# Patient Record
Sex: FEMALE | Race: BLACK OR AFRICAN AMERICAN | Employment: UNEMPLOYED | URBAN - METROPOLITAN AREA
[De-identification: names, ages, dates, MRNs, and addresses within clinical notes are randomized per-mention and may not be internally consistent; named-entity substitution may affect disease eponyms.]

---

## 2017-01-31 DIAGNOSIS — Z79.4 TYPE 2 DIABETES MELLITUS WITH HYPERGLYCEMIA, WITH LONG-TERM CURRENT USE OF INSULIN (HCC): Primary | ICD-10-CM

## 2017-01-31 DIAGNOSIS — E11.65 TYPE 2 DIABETES MELLITUS WITH HYPERGLYCEMIA, WITH LONG-TERM CURRENT USE OF INSULIN (HCC): Primary | ICD-10-CM

## 2017-01-31 RX ORDER — INSULIN LISPRO 100 [IU]/ML
INJECTION, SOLUTION INTRAVENOUS; SUBCUTANEOUS
Qty: 30 ML | Refills: 2 | Status: SHIPPED | OUTPATIENT
Start: 2017-01-31 | End: 2019-09-12 | Stop reason: ALTCHOICE

## 2017-02-03 ENCOUNTER — TELEPHONE (OUTPATIENT)
Dept: ENDOCRINOLOGY | Age: 37
End: 2017-02-03

## 2017-02-22 ENCOUNTER — TELEPHONE (OUTPATIENT)
Dept: ENDOCRINOLOGY | Age: 37
End: 2017-02-22

## 2017-03-06 NOTE — TELEPHONE ENCOUNTER
Received fax from [facility] stating pt is no longer eligible. Attempted to call all 3 #s on file to have pt update insurance information. #'s either not in service/ no voicemail setup.

## 2018-08-14 RX ORDER — INSULIN DETEMIR 100 [IU]/ML
INJECTION, SOLUTION SUBCUTANEOUS
Qty: 10 ML | Refills: 0 | Status: SHIPPED | OUTPATIENT
Start: 2018-08-14 | End: 2019-09-12

## 2018-08-16 RX ORDER — PEN NEEDLE, DIABETIC 30 GX3/16"
NEEDLE, DISPOSABLE MISCELLANEOUS
Qty: 300 PEN NEEDLE | Refills: 0 | Status: SHIPPED | OUTPATIENT
Start: 2018-08-16 | End: 2019-09-13 | Stop reason: SDUPTHER

## 2019-09-12 ENCOUNTER — OFFICE VISIT (OUTPATIENT)
Dept: ENDOCRINOLOGY | Age: 39
End: 2019-09-12

## 2019-09-12 VITALS
OXYGEN SATURATION: 100 % | HEIGHT: 61 IN | WEIGHT: 239 LBS | HEART RATE: 80 BPM | BODY MASS INDEX: 45.12 KG/M2 | DIASTOLIC BLOOD PRESSURE: 80 MMHG | TEMPERATURE: 97.1 F | SYSTOLIC BLOOD PRESSURE: 157 MMHG | RESPIRATION RATE: 14 BRPM

## 2019-09-12 DIAGNOSIS — E66.01 MORBID OBESITY (HCC): ICD-10-CM

## 2019-09-12 DIAGNOSIS — I10 ESSENTIAL HYPERTENSION: ICD-10-CM

## 2019-09-12 DIAGNOSIS — E11.65 UNCONTROLLED TYPE 2 DIABETES MELLITUS WITH HYPERGLYCEMIA (HCC): Primary | ICD-10-CM

## 2019-09-12 DIAGNOSIS — E11.65 TYPE 2 DIABETES MELLITUS WITH HYPERGLYCEMIA, WITH LONG-TERM CURRENT USE OF INSULIN (HCC): Primary | ICD-10-CM

## 2019-09-12 DIAGNOSIS — E11.65 TYPE 2 DIABETES MELLITUS WITH HYPERGLYCEMIA, UNSPECIFIED WHETHER LONG TERM INSULIN USE (HCC): ICD-10-CM

## 2019-09-12 DIAGNOSIS — Z79.4 TYPE 2 DIABETES MELLITUS WITH HYPERGLYCEMIA, WITH LONG-TERM CURRENT USE OF INSULIN (HCC): Primary | ICD-10-CM

## 2019-09-12 RX ORDER — INSULIN ASPART 100 [IU]/ML
INJECTION, SOLUTION INTRAVENOUS; SUBCUTANEOUS
Qty: 30 ML | Refills: 11 | Status: SHIPPED | OUTPATIENT
Start: 2019-09-12 | End: 2020-10-09

## 2019-09-12 RX ORDER — INSULIN PUMP SYRINGE, 3 ML
EACH MISCELLANEOUS
Qty: 1 KIT | Refills: 0 | Status: SHIPPED | OUTPATIENT
Start: 2019-09-12

## 2019-09-12 RX ORDER — INSULIN ASPART 100 [IU]/ML
INJECTION, SOLUTION INTRAVENOUS; SUBCUTANEOUS
COMMUNITY
End: 2019-09-12 | Stop reason: SDUPTHER

## 2019-09-12 RX ORDER — INSULIN GLARGINE 100 [IU]/ML
INJECTION, SOLUTION SUBCUTANEOUS
Qty: 20 ML | Refills: 11 | Status: SHIPPED | OUTPATIENT
Start: 2019-09-12 | End: 2020-09-08 | Stop reason: SDUPTHER

## 2019-09-12 RX ORDER — METFORMIN HYDROCHLORIDE 750 MG/1
750 TABLET, EXTENDED RELEASE ORAL 2 TIMES DAILY
Qty: 60 TAB | Refills: 11 | Status: SHIPPED | OUTPATIENT
Start: 2019-09-12 | End: 2020-10-27 | Stop reason: SDUPTHER

## 2019-09-12 RX ORDER — LANCETS 33 GAUGE
EACH MISCELLANEOUS
Qty: 100 LANCET | Refills: 11 | Status: SHIPPED | OUTPATIENT
Start: 2019-09-12

## 2019-09-12 NOTE — PATIENT INSTRUCTIONS
Check blood sugars before meals     If the bedtime sugars are less than 100 ,eat a 15 gm snack. Weight and diet control. Lantus insulin 60 units at bedtime    Humalog or Novolog insulin ( fast acting ) 15 units before breakfast  15 units before lunch and 15 units before dinner.      Metformin extended release 750 mg twice day     Additional Humalog or Novolog Blood sugar  Breakfast/Lunch/Dinner        150-200  Add  3  Units       201-250  Add  6 Units       251-300  Add  9 Units       301-350  Add 12  Units        351-400  Add 15  Units

## 2019-09-12 NOTE — PROGRESS NOTES
Kelle Joshi is a 41 y.o. female here for   Chief Complaint   Patient presents with    Diabetes     LV 2016    Diabetic Foot Exam       Functional glucose monitor and record keeping system? -yes   Eye exam within last year? -due   Foot exam within last year? - due    1. Have you been to the ER, urgent care clinic since your last visit? Hospitalized since your last visit? -[facility] 8/19/19 left thigh cellulitis    2. Have you seen or consulted any other health care providers outside of the [address] [place] [address] since your last visit?   Include any pap smears or colon screening.-[facility]

## 2019-09-12 NOTE — PROGRESS NOTES
Lizzette Caruso MD              Patient Information  Date:9/12/2019  Name : Job Raman 41 y.o.     YOB: 1980         Referred by: No primary care provider on file. Chief Complaint   Patient presents with    Diabetes     LV 2016    Diabetic Foot Exam       History of Present Illness: Job Raman is a 41 y.o. female here here for evaluation management of type 2 diabetes mellitus. She was seen in 2016, for follow-up. Recently had spider bite and cellulitis, was on antibiotics. She is on Lantus, taking prandial insulin based on the sliding scale. She is not checking the blood glucose and hence not taking prandial insulin consistently. Not on metformin  Appetite has decreased, she is drinking sodas and on liquid calories  Works as a CNA  Complains of tiredness, fatigue.   No exercise  No recent dietitian visit    History of kidney stone    Hypoglycemia: no    No chest pain or shortness of breath  Weight has been fluctuating      Wt Readings from Last 3 Encounters:   09/12/19 239 lb (108.4 kg)   03/09/16 260 lb (117.9 kg)   09/09/14 242 lb (109.8 kg)       BP Readings from Last 3 Encounters:   09/12/19 157/80   03/09/16 (!) 153/91   09/09/14 139/87           Past Medical History:   Diagnosis Date    Asthma 7/19/2010    Blood infection ([address] 2014    Excessive, frequent and irregular menstruation 7/19/2010    HTN (hypertension) 7/19/2010    Kidney stones 2014    Obese 7/19/2010    Obesity 7/19/2010    Type II or unspecified type diabetes mellitus without mention of complication, uncontrolled      Current Outpatient Medications   Medication Sig    insulin aspart U-100 (NOVOLOG FLEXPEN U-100 INSULIN) 100 unit/mL (3 mL) inpn With SSI    insulin glargine (LANTUS) 100 unit/mL injection Inject 40 units at bedtime (Patient taking differently: Inject 60 units at bedtime)    Insulin Needles, Disposable, (BD ULTRA-FINE SHORT PEN NEEDLE) 31 gauge x 5/16\" ndle USE AS DIRECTED daily Dx Code: E11.65    insulin lispro (HUMALOG) 100 unit/mL injection Inject 8 units before breakfast 8 units before lunch and 8 units before dinner w/SSI. Max Daily Units: 69 Stop Apidra    glucose blood VI test strips (TRUETEST TEST STRIPS) strip Test blood glucose 3x daily    Lancets misc Test blood gluocose 3x daily    insulin syringe-needle U-100 (BD INSULIN SYRINGE ULTRA-FINE) 1 mL 31 x 15/64\" syrg 1 Syringe by Does Not Apply route four (4) times daily.  lisinopril-hydrochlorothiazide (PRINZIDE, ZESTORETIC) 10-12.5 mg per tablet TAKE 1 TABLET BY MOUTH EVERY DAY    glucose blood VI test strips (ACCU-CHEK SMARTVIEW TEST STRIP) strip Test blood glucose 3 times daily     No current facility-administered medications for this visit. No Known Allergies      Review of Systems: All review of symptoms negative other than mentioned in HPI  Physical Examination:   Blood pressure 157/80, pulse 80, temperature 97.1 °F (36.2 °C), temperature source Oral, resp. rate 14, height 5' 1\" (1.549 m), weight 239 lb (108.4 kg), SpO2 100 %. Estimated body mass index is 45.16 kg/m² as calculated from the following:    Height as of this encounter: 5' 1\" (1.549 m). -   Weight as of this encounter: 239 lb (108.4 kg).   - General: pleasant, no distress, good eye contact,obese  - HEENT: no pallor, no periorbital edema, EOMI  - Neck: supple, no thyromegaly, no lyphadenopathy  - Cardiovascular: regular, normal rate, normal S1 and S2  - Respiratory: clear to auscultation bilaterally  - Gastrointestinal: soft, nontender, nondistended,  BS +  - Musculoskeletal:+ acanthosis nigricans,  - Neurological: alert and oriented  - Psychiatric: normal mood and affect  - Skin: color, texture, turgor normal.   Diabetic foot exam: September 2019    Left:     Vibratory sensation absent   Filament test decreased sensation with micro filament   Pulse DP: 1+    Deformities: None  Right:    Vibratory sensation absent   Filament test decreased sensation with micro filament   Pulse DP: 1+   Deformities: None      Data Reviewed:     A1c 11 August 2019      Assessment/Plan:     1. Type 2 Diabetes Mellitus ,uncontrolled,insulin resistance  Poorly controlled diabetes mellitus, high risk for complications  Counseled at length  Lantus/NovoLog  Metformin extended release 750 mg twice daily, discussed the benefits  Stressed the importance of low-carb diet, increase activity as tolerated  Avoid liquid calories  Advised to check glucose 3 times daily    FLU annually ,Pneumovax ,aspirin daily,annual eye exam,microalbumin    2. HTN : Continue current therapy  Low-salt diet    3. Obesity:Body mass index is 45.16 kg/m². BMI is out of normal parameters and plan is as follows: I have counseled this patient on diet and exercise regimens. .Discussed about the importance of exercise. Could not afford Victoza in the past          Patient Instructions   Check blood sugars before meals     If the bedtime sugars are less than 100 ,eat a 15 gm snack. Weight and diet control. Lantus insulin 60 units at bedtime    Humalog or Novolog insulin ( fast acting ) 15 units before breakfast  15 units before lunch and 15 units before dinner. Metformin 1000 mg twice day     Additional Humalog or Novolog Blood sugar  Breakfast/Lunch/Dinner        150-200  Add  3  Units       201-250  Add  6 Units       251-300  Add  9 Units       301-350  Add 12  Units        351-400  Add 15  Units           Thank you for allowing me to participate in the care of this patient.     Holly Covarrubias MD

## 2019-09-12 NOTE — LETTER
9/12/19 Patient: Linda Burroughs YOB: 1980 Date of Visit: 9/12/2019 Princess Yi MD 
[id number] [address] [address] VIA In Basket Dear Princess Yi MD, Thank you for referring Ms. Linda Burroughs to [address] [place] [address] for evaluation. My notes for this consultation are attached. If you have questions, please do not hesitate to call me. I look forward to following your patient along with you. Sincerely, Madyson Arita MD

## 2019-09-13 ENCOUNTER — TELEPHONE (OUTPATIENT)
Dept: ENDOCRINOLOGY | Age: 39
End: 2019-09-13

## 2019-09-13 RX ORDER — SYRINGE AND NEEDLE,INSULIN,1ML 31GX15/64"
1 SYRINGE, EMPTY DISPOSABLE MISCELLANEOUS DAILY
Qty: 100 PEN NEEDLE | Refills: 11 | Status: SHIPPED | OUTPATIENT
Start: 2019-09-13 | End: 2020-09-08 | Stop reason: SDUPTHER

## 2019-09-13 RX ORDER — PEN NEEDLE, DIABETIC 30 GX3/16"
NEEDLE, DISPOSABLE MISCELLANEOUS
Qty: 300 PEN NEEDLE | Refills: 3 | Status: SHIPPED | OUTPATIENT
Start: 2019-09-13 | End: 2020-09-08 | Stop reason: SDUPTHER

## 2019-09-13 NOTE — LETTER
9/16/2019 10:07 AM 
 
Ms. Hortencia Smalls 
[address] [id number] To Whom it may concern, Hortencia Smalls is a patient of mine who has diabetes. She is currently managing diabetes with insulin.   
  
Ms. Cervantes needs to have access to insulin pen(s) and pen needles, along with testing supplies at all times to control blood sugar. Please allow her to proceed through security with these items. If there are any questions or concerns, contact the office at [phone]. Sincerely, Gallito See MD

## 2019-09-13 NOTE — TELEPHONE ENCOUNTER
----- Message from Jorge Jama sent at 9/13/2019  1:29 PM EDT -----  Regarding: Dr. Harshil Ojeda first and last name: Daniela Sheehan      Reason for call: pt requesting something to carry around with her giving her permission to have the insulin needs because she got pulled and they wanted to arrest her for having them      Callback required yes/no and why: yes      Best contact number(s): [phone]      Details to clarify the request:      Jorge Jama

## 2019-09-15 LAB
ALBUMIN/CREAT UR: 23.1 MG/G CREAT (ref 0–30)
CREAT UR-MCNC: 101.1 MG/DL
MICROALBUMIN UR-MCNC: 23.4 UG/ML

## 2019-10-23 ENCOUNTER — TELEPHONE (OUTPATIENT)
Dept: ENDOCRINOLOGY | Age: 39
End: 2019-10-23

## 2019-10-23 NOTE — PROGRESS NOTES
Everett Jarquin is a 41 y.o. female here for   Chief Complaint   Patient presents with    Diabetes       Functional glucose monitor and record keeping system? -yes   Eye exam within last year? - due  Foot exam within last year? -on file     1. Have you been to the ER, urgent care clinic since your last visit? Hospitalized since your last visit? -no    2. Have you seen or consulted any other health care providers outside of the [address] [place] [address] since your last visit? Include any pap smears or colon screening. -PCP

## 2019-10-23 NOTE — TELEPHONE ENCOUNTER
----- Message from Jamilah Westbrook sent at 10/23/2019 10:03 AM EDT -----  Regarding: Dr.Muthyala Josefina Sierra  Patient return call    Caller's first and last name and relationship (if not the patient):      Best contact number(s): [phone]      Whose call is being returned:office      Details to clarify the request: Pt called requesting a call back from a missed call      Jamilah Westbrook

## 2019-10-24 ENCOUNTER — OFFICE VISIT (OUTPATIENT)
Dept: ENDOCRINOLOGY | Age: 39
End: 2019-10-24

## 2019-10-24 VITALS
SYSTOLIC BLOOD PRESSURE: 137 MMHG | WEIGHT: 242 LBS | HEIGHT: 61 IN | TEMPERATURE: 98.4 F | OXYGEN SATURATION: 100 % | DIASTOLIC BLOOD PRESSURE: 88 MMHG | RESPIRATION RATE: 16 BRPM | HEART RATE: 83 BPM | BODY MASS INDEX: 45.69 KG/M2

## 2019-10-24 DIAGNOSIS — Z79.4 TYPE 2 DIABETES MELLITUS WITH HYPERGLYCEMIA, WITH LONG-TERM CURRENT USE OF INSULIN (HCC): Primary | ICD-10-CM

## 2019-10-24 DIAGNOSIS — E66.01 MORBID OBESITY (HCC): ICD-10-CM

## 2019-10-24 DIAGNOSIS — E11.65 TYPE 2 DIABETES MELLITUS WITH HYPERGLYCEMIA, WITH LONG-TERM CURRENT USE OF INSULIN (HCC): Primary | ICD-10-CM

## 2019-10-24 NOTE — PROGRESS NOTES
Andrew Ashton MD              Patient Information  Date:10/24/2019  Name : Mercedez Gibson 41 y.o.     YOB: 1980         Referred by: No primary care provider on file. Chief Complaint   Patient presents with    Diabetes       History of Present Illness: Mercedez Gibson is a 41 y.o. female here here for evaluation management of type 2 diabetes mellitus. Follow-up is not consistent  She is checking the blood glucose 2 times daily  When the blood glucose is normal she is not taking Lantus followed by a.m. hyperglycemia  Reviewed the log, glucose is fluctuating  Reports decreased appetite  On liquid calories  Works as a CNA  Complains of tiredness, fatigue. History of kidney stone    Hypoglycemia: no    No chest pain or shortness of breath  Weight has been fluctuating      Wt Readings from Last 3 Encounters:   10/24/19 242 lb (109.8 kg)   09/12/19 239 lb (108.4 kg)   03/09/16 260 lb (117.9 kg)       BP Readings from Last 3 Encounters:   10/24/19 137/88   09/12/19 157/80   03/09/16 (!) 153/91           Past Medical History:   Diagnosis Date    Asthma 7/19/2010    Blood infection ([facility] 2014    Excessive, frequent and irregular menstruation 7/19/2010    HTN (hypertension) 7/19/2010    Kidney stones 2014    Obese 7/19/2010    Obesity 7/19/2010    Type II or unspecified type diabetes mellitus without mention of complication, uncontrolled      Current Outpatient Medications   Medication Sig    insulin syringe-needle U-100 (BD INSULIN SYRINGE ULTRA-FINE) 1 mL 31 gauge x 15/64\" syrg 1 Syringe by SubCUTAneous route daily.  Dx Code: E11.65    Insulin Needles, Disposable, (BD ULTRA-FINE SHORT PEN NEEDLE) 31 gauge x 5/16\" ndle Use to inject Novolog TID Dx Code: E11.65    Blood-Glucose Meter (ONETOUCH VERIO FLEX START) monitoring kit Test blood glucose 3x daily Dx Code: E11.65    glucose blood VI test strips (ONETOUCH VERIO) strip Test blood glucose 3x daily Dx Code: E11.65    lancets (ONE TOUCH DELICA) 33 gauge misc Test blood glucose 3x daily Dx Code: E11.65    insulin glargine (LANTUS) 100 unit/mL injection Inject 60 units at bedtime    insulin aspart U-100 (NOVOLOG FLEXPEN U-100 INSULIN) 100 unit/mL (3 mL) inpn Inject 15 units before breakfast, 15 units before lunch and 15 units before dinner w/ SSI Max unit daily: 90    metFORMIN ER (GLUCOPHAGE XR) 750 mg tablet Take 1 Tab by mouth two (2) times a day.  lisinopril-hydrochlorothiazide (PRINZIDE, ZESTORETIC) 10-12.5 mg per tablet TAKE 1 TABLET BY MOUTH EVERY DAY     No current facility-administered medications for this visit. No Known Allergies      Review of Systems: All review of symptoms negative other than mentioned in HPI  Physical Examination:   Blood pressure 137/88, pulse 83, temperature 98.4 °F (36.9 °C), temperature source Oral, resp. rate 16, height 5' 1\" (1.549 m), weight 242 lb (109.8 kg), SpO2 100 %. Estimated body mass index is 45.73 kg/m² as calculated from the following:    Height as of this encounter: 5' 1\" (1.549 m). -   Weight as of this encounter: 242 lb (109.8 kg).   - General: pleasant, no distress, good eye contact,obese  - HEENT: no pallor, no periorbital edema, EOMI  - Neck: supple, no thyromegaly, no lyphadenopathy  - Cardiovascular: regular, normal rate, normal S1 and S2  - Respiratory: clear to auscultation bilaterally  - Gastrointestinal: soft, nontender, nondistended,  BS +  - Musculoskeletal:+ acanthosis nigricans,  - Neurological: alert and oriented  - Psychiatric: normal mood and affect  - Skin: color, texture, turgor normal.   Diabetic foot exam: September 2019    Left:     Vibratory sensation absent   Filament test decreased sensation with micro filament   Pulse DP: 1+    Deformities: None  Right:    Vibratory sensation absent   Filament test decreased sensation with micro filament   Pulse DP: 1+   Deformities: None      Data Reviewed:     A1c 11 August 2019      Assessment/Plan:     1. Type 2 Diabetes Mellitus ,uncontrolled,insulin resistance  Poorly controlled diabetes mellitus, high risk for complications    Lantus/NovoLog  Metformin extended release 750 mg twice daily, discussed the benefits  Avoid liquid calories  Discussed the basis of basal insulin even when the glucose is normal, if she has hypoglycemia to call  Advised to check glucose 3 times daily    FLU annually ,Pneumovax ,aspirin daily,annual eye exam,microalbumin    2. HTN : Continue current therapy  Low-salt diet    3. Obesity:Body mass index is 45.73 kg/m². BMI is out of normal parameters and plan is as follows: I have counseled this patient on diet and exercise regimens. .Discussed about the importance of exercise. Could not afford Victoza in the past          Patient Instructions   Check blood sugars before meals     If the bedtime sugars are less than 100 ,eat a 15 gm snack. Weight and diet control. Lantus insulin 60 units at bedtime    Humalog or Novolog insulin ( fast acting ) 15 units before breakfast  15 units before lunch and 15 units before dinner. NO Novolog or Humalog if sugars are less than 70     Metformin extended release 750 mg twice day     Additional Humalog or Novolog Blood sugar  Breakfast/Lunch/Dinner        150-200  Add  3  Units       201-250  Add  6 Units       251-300  Add  9 Units       301-350  Add 12  Units        351-400  Add 15  Units       Follow-up and Dispositions    · Return in about 4 months (around 2/24/2020) for labs before next visit and follow up. Thank you for allowing me to participate in the care of this patient. Ty Kruse MD    Voice-recognition software was used to generate this report, which may result in some phonetic-based errors in the grammar and contents. Even though attempts were made to correct all the mistakes, some may have been missed and remained in the body of the report.

## 2019-10-24 NOTE — PATIENT INSTRUCTIONS
Check blood sugars before meals     If the bedtime sugars are less than 100 ,eat a 15 gm snack. Weight and diet control. Lantus insulin 60 units at bedtime    Humalog or Novolog insulin ( fast acting ) 15 units before breakfast  15 units before lunch and 15 units before dinner.  NO Novolog or Humalog if sugars are less than 70     Metformin extended release 750 mg twice day     Additional Humalog or Novolog Blood sugar  Breakfast/Lunch/Dinner        150-200  Add  3  Units       201-250  Add  6 Units       251-300  Add  9 Units       301-350  Add 12  Units        351-400  Add 15  Units

## 2020-09-08 DIAGNOSIS — E11.65 TYPE 2 DIABETES MELLITUS WITH HYPERGLYCEMIA, WITH LONG-TERM CURRENT USE OF INSULIN (HCC): ICD-10-CM

## 2020-09-08 DIAGNOSIS — Z79.4 TYPE 2 DIABETES MELLITUS WITH HYPERGLYCEMIA, WITH LONG-TERM CURRENT USE OF INSULIN (HCC): ICD-10-CM

## 2020-09-08 RX ORDER — SYRINGE AND NEEDLE,INSULIN,1ML 31GX15/64"
1 SYRINGE, EMPTY DISPOSABLE MISCELLANEOUS DAILY
Qty: 100 PEN NEEDLE | Refills: 0 | Status: SHIPPED | OUTPATIENT
Start: 2020-09-08

## 2020-09-08 RX ORDER — PEN NEEDLE, DIABETIC 30 GX3/16"
NEEDLE, DISPOSABLE MISCELLANEOUS
Qty: 300 PEN NEEDLE | Refills: 0 | Status: SHIPPED | OUTPATIENT
Start: 2020-09-08

## 2020-09-08 RX ORDER — INSULIN GLARGINE 100 [IU]/ML
INJECTION, SOLUTION SUBCUTANEOUS
Qty: 20 ML | Refills: 0 | Status: SHIPPED | OUTPATIENT
Start: 2020-09-08

## 2020-10-27 DIAGNOSIS — E11.65 TYPE 2 DIABETES MELLITUS WITH HYPERGLYCEMIA, WITH LONG-TERM CURRENT USE OF INSULIN (HCC): ICD-10-CM

## 2020-10-27 DIAGNOSIS — Z79.4 TYPE 2 DIABETES MELLITUS WITH HYPERGLYCEMIA, WITH LONG-TERM CURRENT USE OF INSULIN (HCC): ICD-10-CM

## 2020-10-27 RX ORDER — METFORMIN HYDROCHLORIDE 750 MG/1
750 TABLET, EXTENDED RELEASE ORAL 2 TIMES DAILY
Qty: 60 TAB | Refills: 1 | Status: SHIPPED | OUTPATIENT
Start: 2020-10-27

## 2022-03-19 PROBLEM — E11.65 TYPE 2 DIABETES MELLITUS WITH HYPERGLYCEMIA (HCC): Status: ACTIVE | Noted: 2019-09-12

## 2022-06-16 ENCOUNTER — HOSPITAL ENCOUNTER (EMERGENCY)
Age: 42
Discharge: HOME | End: 2022-06-16
Attending: STUDENT IN AN ORGANIZED HEALTH CARE EDUCATION/TRAINING PROGRAM
Payer: COMMERCIAL

## 2022-06-16 VITALS
HEART RATE: 62 BPM | DIASTOLIC BLOOD PRESSURE: 117 MMHG | SYSTOLIC BLOOD PRESSURE: 136 MMHG | RESPIRATION RATE: 16 BRPM | HEIGHT: 57 IN | WEIGHT: 149 LBS | OXYGEN SATURATION: 100 % | BODY MASS INDEX: 32.15 KG/M2 | TEMPERATURE: 98.4 F

## 2022-06-16 DIAGNOSIS — F41.1 ANXIETY STATE: ICD-10-CM

## 2022-06-16 DIAGNOSIS — R10.84 ABDOMINAL PAIN, GENERALIZED: Primary | ICD-10-CM

## 2022-06-16 LAB
ALBUMIN SERPL-MCNC: 4.8 G/DL (ref 3.5–5.2)
ALBUMIN/GLOB SERPL: 1.5 {RATIO} (ref 1.1–2.2)
ALP SERPL-CCNC: 93 U/L (ref 35–104)
ALT SERPL-CCNC: 11 U/L (ref 10–35)
AMPHET UR QL SCN: NEGATIVE
ANION GAP SERPL CALC-SCNC: 12 MMOL/L (ref 5–15)
APPEARANCE UR: CLEAR
AST SERPL-CCNC: 13 U/L (ref 10–35)
BACTERIA URNS QL MICRO: NEGATIVE /HPF
BARBITURATES UR QL SCN: NEGATIVE
BASOPHILS # BLD: 0.1 K/UL (ref 0–0.1)
BASOPHILS NFR BLD: 1 % (ref 0–1)
BENZODIAZ UR QL: NEGATIVE
BILIRUB SERPL-MCNC: 1 MG/DL (ref 0.2–1)
BILIRUB UR QL: NEGATIVE
BUN SERPL-MCNC: 11 MG/DL (ref 6–20)
BUN/CREAT SERPL: 15 (ref 12–20)
CALCIUM SERPL-MCNC: 9.8 MG/DL (ref 8.6–10)
CANNABINOIDS UR QL SCN: POSITIVE
CHLORIDE SERPL-SCNC: 101 MMOL/L (ref 98–107)
CO2 SERPL-SCNC: 25 MMOL/L (ref 22–29)
COCAINE UR QL SCN: NEGATIVE
COLOR UR: ABNORMAL
COMMENT, HOLDF: NORMAL
CREAT SERPL-MCNC: 0.74 MG/DL (ref 0.5–0.9)
DIFFERENTIAL METHOD BLD: ABNORMAL
DRUG SCRN COMMENT,DRGCM: ABNORMAL
EOSINOPHIL # BLD: 0 K/UL (ref 0–0.4)
EOSINOPHIL NFR BLD: 0 % (ref 0–7)
EPITH CASTS URNS QL MICRO: ABNORMAL /LPF
ERYTHROCYTE [DISTWIDTH] IN BLOOD BY AUTOMATED COUNT: 13.5 % (ref 11.5–14.5)
GLOBULIN SER CALC-MCNC: 3.3 G/DL (ref 2–4)
GLUCOSE SERPL-MCNC: 193 MG/DL (ref 65–100)
GLUCOSE UR STRIP.AUTO-MCNC: 250 MG/DL
HCT VFR BLD AUTO: 40.5 % (ref 35–47)
HGB BLD-MCNC: 13.6 G/DL (ref 11.5–16)
HGB UR QL STRIP: ABNORMAL
HYALINE CASTS URNS QL MICRO: ABNORMAL /LPF
IMM GRANULOCYTES # BLD AUTO: 0 K/UL (ref 0–0.04)
IMM GRANULOCYTES NFR BLD AUTO: 0 % (ref 0–0.5)
KETONES UR QL STRIP.AUTO: 40 MG/DL
LEUKOCYTE ESTERASE UR QL STRIP.AUTO: NEGATIVE
LYMPHOCYTES # BLD: 2.8 K/UL (ref 0.8–3.5)
LYMPHOCYTES NFR BLD: 31 % (ref 12–49)
MCH RBC QN AUTO: 28.2 PG (ref 26–34)
MCHC RBC AUTO-ENTMCNC: 33.6 G/DL (ref 30–36.5)
MCV RBC AUTO: 84 FL (ref 80–99)
METHADONE UR QL: NEGATIVE
MONOCYTES # BLD: 0.4 K/UL (ref 0–1)
MONOCYTES NFR BLD: 4 % (ref 5–13)
NEUTS SEG # BLD: 5.9 K/UL (ref 1.8–8)
NEUTS SEG NFR BLD: 64 % (ref 32–75)
NITRITE UR QL STRIP.AUTO: NEGATIVE
NRBC # BLD: 0 K/UL (ref 0–0.01)
NRBC BLD-RTO: 0 PER 100 WBC
OPIATES UR QL: NEGATIVE
PCP UR QL: NEGATIVE
PH UR STRIP: 7 [PH] (ref 5–8)
PLATELET # BLD AUTO: 352 K/UL (ref 150–400)
PMV BLD AUTO: 11.4 FL (ref 8.9–12.9)
POTASSIUM SERPL-SCNC: 3.3 MMOL/L (ref 3.5–5.1)
PROT SERPL-MCNC: 8.1 G/DL (ref 6.4–8.3)
PROT UR STRIP-MCNC: ABNORMAL MG/DL
RBC # BLD AUTO: 4.82 M/UL (ref 3.8–5.2)
RBC #/AREA URNS HPF: ABNORMAL /HPF
SAMPLES BEING HELD,HOLD: NORMAL
SODIUM SERPL-SCNC: 138 MMOL/L (ref 136–145)
SP GR UR REFRACTOMETRY: 1.01 (ref 1–1.03)
TROPONIN I BLD-MCNC: <0.04 NG/ML (ref 0–0.08)
UR CULT HOLD, URHOLD: NORMAL
UROBILINOGEN UR QL STRIP.AUTO: 0.2 EU/DL (ref 0.2–1)
WBC # BLD AUTO: 9.3 K/UL (ref 3.6–11)
WBC URNS QL MICRO: ABNORMAL /HPF (ref 0–4)

## 2022-06-16 PROCEDURE — 85025 COMPLETE CBC W/AUTO DIFF WBC: CPT

## 2022-06-16 PROCEDURE — 80307 DRUG TEST PRSMV CHEM ANLYZR: CPT

## 2022-06-16 PROCEDURE — 93005 ELECTROCARDIOGRAM TRACING: CPT

## 2022-06-16 PROCEDURE — 96372 THER/PROPH/DIAG INJ SC/IM: CPT

## 2022-06-16 PROCEDURE — 74011250636 HC RX REV CODE- 250/636: Performed by: STUDENT IN AN ORGANIZED HEALTH CARE EDUCATION/TRAINING PROGRAM

## 2022-06-16 PROCEDURE — 81001 URINALYSIS AUTO W/SCOPE: CPT

## 2022-06-16 PROCEDURE — 80053 COMPREHEN METABOLIC PANEL: CPT

## 2022-06-16 PROCEDURE — 99284 EMERGENCY DEPT VISIT MOD MDM: CPT

## 2022-06-16 PROCEDURE — 74011250637 HC RX REV CODE- 250/637: Performed by: STUDENT IN AN ORGANIZED HEALTH CARE EDUCATION/TRAINING PROGRAM

## 2022-06-16 PROCEDURE — 84484 ASSAY OF TROPONIN QUANT: CPT

## 2022-06-16 PROCEDURE — 36415 COLL VENOUS BLD VENIPUNCTURE: CPT

## 2022-06-16 RX ORDER — LORAZEPAM 2 MG/ML
2 INJECTION, SOLUTION INTRAMUSCULAR; INTRAVENOUS ONCE
Status: COMPLETED | OUTPATIENT
Start: 2022-06-16 | End: 2022-06-16

## 2022-06-16 RX ORDER — ONDANSETRON 4 MG/1
8 TABLET, ORALLY DISINTEGRATING ORAL
Status: COMPLETED | OUTPATIENT
Start: 2022-06-16 | End: 2022-06-16

## 2022-06-16 RX ORDER — LORAZEPAM 2 MG/ML
INJECTION, SOLUTION INTRAMUSCULAR; INTRAVENOUS
Status: DISCONTINUED
Start: 2022-06-16 | End: 2022-06-16 | Stop reason: HOSPADM

## 2022-06-16 RX ORDER — ONDANSETRON 2 MG/ML
4 INJECTION INTRAMUSCULAR; INTRAVENOUS
Status: DISCONTINUED | OUTPATIENT
Start: 2022-06-16 | End: 2022-06-16

## 2022-06-16 RX ADMIN — LORAZEPAM 2 MG: 2 INJECTION, SOLUTION INTRAMUSCULAR; INTRAVENOUS at 17:29

## 2022-06-16 RX ADMIN — ONDANSETRON 8 MG: 4 TABLET, ORALLY DISINTEGRATING ORAL at 18:45

## 2022-06-16 NOTE — LETTER
NOTIFICATION OF RETURN TO WORK / SCHOOL     6/17/2022    Ms. Mark Croft  [address] [address]      To Whom It May Concern:    Mark Croft was under the care of [facility] on 06/16/22. She will return to work on 06/18/2022 with no restrictions. If there are questions or concerns please have the patient contact our ED.         Sincerely,      Mathew Roach RN

## 2022-06-16 NOTE — ED NOTES
Pt up from room at front nursing station trying to use the phone. Pt re-educated on importance to stay in the room on the monitor.

## 2022-06-16 NOTE — ED TRIAGE NOTES
Pt to ER with c/o anxiety after having an episode of vomiting and shortness of breath that started at 1100. Pt reports she is still having generalized abd pain. Pt reports hx of htn and sts her pcp has been changing her bp meds due to it being uncontrolled.

## 2022-06-20 LAB
ATRIAL RATE: 57 BPM
CALCULATED P AXIS, ECG09: 61 DEGREES
CALCULATED R AXIS, ECG10: 62 DEGREES
CALCULATED T AXIS, ECG11: 52 DEGREES
DIAGNOSIS, 93000: NORMAL
P-R INTERVAL, ECG05: 124 MS
Q-T INTERVAL, ECG07: 410 MS
QRS DURATION, ECG06: 72 MS
QTC CALCULATION (BEZET), ECG08: 399 MS
VENTRICULAR RATE, ECG03: 57 BPM

## 2022-06-23 NOTE — ED PROVIDER NOTES
Patient is a 37-year-old female present emergency department for panic attack, anxiety. Patient's had an episode of anxiety has been having associated nausea and vomiting and shortness of breath that started earlier today. Patient is also having generalized abdominal pain. Patient denies any chest pain, fevers, chills dizziness or lightheadedness. Past Medical History:   Diagnosis Date    Asthma 2010    Blood infection ([address]     Excessive, frequent and irregular menstruation 2010    HTN (hypertension) 2010    Kidney stones     Obese 2010    Obesity 2010    Type II or unspecified type diabetes mellitus without mention of complication, uncontrolled        Past Surgical History:   Procedure Laterality Date    HX ANKLE FRACTURE [place]      HX  SECTION      HX HERNIA REPAIR      HX HERNIA REPAIR  2019    HX LITHOTRIPSY      HX OTHER SURGICAL  2019    cellulitis         Family History:   Problem Relation Age of Onset    Diabetes Maternal Aunt     Diabetes Maternal Grandfather        Social History     Socioeconomic History    Marital status: SINGLE     Spouse name: Not on file    Number of children: Not on file    Years of education: Not on file    Highest education level: Not on file   Occupational History    Not on file   Tobacco Use    Smoking status: Never Smoker    Smokeless tobacco: Never Used   Substance and Sexual Activity    Alcohol use: No    Drug use: Not on file    Sexual activity: Yes     Partners: Male   Other Topics Concern    Not on file   Social History Narrative    Not on file     Social Determinants of Health     Financial Resource Strain:     Difficulty of Paying Living Expenses: Not on file   Food Insecurity:     Worried About Running Out of Food in the Last Year: Not on file    Aki of Food in the Last Year: Not on file   Transportation Needs:     Lack of Transportation (Medical):  Not on file    Lack of Transportation (Non-Medical): Not on file   Physical Activity:     Days of Exercise per Week: Not on file    Minutes of Exercise per Session: Not on file   Stress:     Feeling of Stress : Not on file   Social Connections:     Frequency of Communication with Friends and Family: Not on file    Frequency of Social Gatherings with Friends and Family: Not on file    Attends Mandaeism Services: Not on file    Active Member of [address] [place] [address] or Organizations: Not on file    Attends Club or Organization Meetings: Not on file    Marital Status: Not on file   Intimate Partner Violence:     Fear of Current or Ex-Partner: Not on file    Emotionally Abused: Not on file    Physically Abused: Not on file    Sexually Abused: Not on file   Housing Stability:     Unable to Pay for Housing in the Last Year: Not on file    Number of [place] in the Last Year: Not on file    Unstable Housing in the Last Year: Not on file         ALLERGIES: Patient has no known allergies. Review of Systems   Respiratory: Positive for shortness of breath. Gastrointestinal: Positive for abdominal pain, nausea and vomiting. Psychiatric/Behavioral: The patient is nervous/anxious. All other systems reviewed and are negative. Vitals:    06/16/22 1546 06/16/22 1621   BP: (!) 235/94 (!) 136/117   Pulse: 62    Resp: 16    Temp: 98.4 °F (36.9 °C)    SpO2: 100% 100%   Weight: 67.6 kg (149 lb)    Height: 4' 9\" (1.448 m)             Physical Exam  Vitals and nursing note reviewed. Constitutional:       Appearance: She is well-developed. HENT:      Head: Normocephalic and atraumatic. Eyes:      Extraocular Movements: Extraocular movements intact. Pupils: Pupils are equal, round, and reactive to light. Cardiovascular:      Rate and Rhythm: Normal rate and regular rhythm. Pulmonary:      Effort: Pulmonary effort is normal.      Breath sounds: Normal breath sounds. Abdominal:      General: Abdomen is flat.       Palpations: Abdomen is soft. Tenderness: There is generalized abdominal tenderness. Skin:     General: Skin is warm and dry. Neurological:      General: No focal deficit present. Mental Status: She is alert and oriented to person, place, and time. Psychiatric:         Mood and Affect: Mood is anxious. MDM  Number of Diagnoses or Management Options  Abdominal pain, generalized  Anxiety state  Diagnosis management comments: Patient is a 81-year-old female present emergency department with generalized anxiety as well as generalized abdominal pain abdominal exam nonfocal.  Will obtain labs, UA, Ativan now for agitation.          Procedures

## 2023-05-18 RX ORDER — METFORMIN HYDROCHLORIDE 750 MG/1
750 TABLET, EXTENDED RELEASE ORAL 2 TIMES DAILY
COMMUNITY
Start: 2020-10-27

## 2023-05-18 RX ORDER — INSULIN GLARGINE 100 [IU]/ML
INJECTION, SOLUTION SUBCUTANEOUS
COMMUNITY
Start: 2020-09-08

## 2023-05-18 RX ORDER — LISINOPRIL AND HYDROCHLOROTHIAZIDE 12.5; 1 MG/1; MG/1
1 TABLET ORAL DAILY
COMMUNITY
Start: 2014-10-21